# Patient Record
Sex: MALE | Race: WHITE | Employment: OTHER | URBAN - METROPOLITAN AREA
[De-identification: names, ages, dates, MRNs, and addresses within clinical notes are randomized per-mention and may not be internally consistent; named-entity substitution may affect disease eponyms.]

---

## 2022-05-18 ENCOUNTER — TRANSCRIBE ORDER (OUTPATIENT)
Dept: SCHEDULING | Age: 57
End: 2022-05-18

## 2022-05-18 DIAGNOSIS — R10.12 LUQ PAIN: ICD-10-CM

## 2022-05-18 DIAGNOSIS — R14.0 BLOATING: Primary | ICD-10-CM

## 2022-05-18 DIAGNOSIS — R10.13 EPIGASTRIC PAIN: ICD-10-CM

## 2022-05-18 DIAGNOSIS — Z12.11 SCREENING FOR COLON CANCER: ICD-10-CM

## 2022-08-31 ENCOUNTER — HOSPITAL ENCOUNTER (OUTPATIENT)
Age: 57
Setting detail: OUTPATIENT SURGERY
Discharge: HOME | End: 2022-08-31
Attending: INTERNAL MEDICINE | Admitting: INTERNAL MEDICINE
Payer: MEDICAID

## 2022-08-31 ENCOUNTER — ANESTHESIA EVENT (OUTPATIENT)
Dept: ENDOSCOPY | Age: 57
End: 2022-08-31
Payer: MEDICAID

## 2022-08-31 ENCOUNTER — ANESTHESIA (OUTPATIENT)
Dept: ENDOSCOPY | Age: 57
End: 2022-08-31
Payer: MEDICAID

## 2022-08-31 VITALS
WEIGHT: 185 LBS | SYSTOLIC BLOOD PRESSURE: 121 MMHG | HEIGHT: 68 IN | OXYGEN SATURATION: 95 % | RESPIRATION RATE: 12 BRPM | DIASTOLIC BLOOD PRESSURE: 85 MMHG | TEMPERATURE: 97.7 F | BODY MASS INDEX: 28.04 KG/M2 | HEART RATE: 64 BPM

## 2022-08-31 PROCEDURE — 2709999900 HC NON-CHARGEABLE SUPPLY: Performed by: INTERNAL MEDICINE

## 2022-08-31 PROCEDURE — 76040000007: Performed by: INTERNAL MEDICINE

## 2022-08-31 PROCEDURE — 77030021593 HC FCPS BIOP ENDOSC BSC -A: Performed by: INTERNAL MEDICINE

## 2022-08-31 PROCEDURE — 88305 TISSUE EXAM BY PATHOLOGIST: CPT

## 2022-08-31 PROCEDURE — 77030013992 HC SNR POLYP ENDOSC BSC -B: Performed by: INTERNAL MEDICINE

## 2022-08-31 PROCEDURE — 76060000032 HC ANESTHESIA 0.5 TO 1 HR: Performed by: INTERNAL MEDICINE

## 2022-08-31 PROCEDURE — 74011250636 HC RX REV CODE- 250/636: Performed by: NURSE ANESTHETIST, CERTIFIED REGISTERED

## 2022-08-31 PROCEDURE — 74011000250 HC RX REV CODE- 250: Performed by: NURSE ANESTHETIST, CERTIFIED REGISTERED

## 2022-08-31 RX ORDER — SODIUM CHLORIDE 0.9 % (FLUSH) 0.9 %
5-40 SYRINGE (ML) INJECTION AS NEEDED
Status: DISCONTINUED | OUTPATIENT
Start: 2022-08-31 | End: 2022-08-31 | Stop reason: HOSPADM

## 2022-08-31 RX ORDER — FLUMAZENIL 0.1 MG/ML
0.2 INJECTION INTRAVENOUS
Status: DISCONTINUED | OUTPATIENT
Start: 2022-08-31 | End: 2022-08-31 | Stop reason: HOSPADM

## 2022-08-31 RX ORDER — MIDAZOLAM HYDROCHLORIDE 1 MG/ML
.25-5 INJECTION, SOLUTION INTRAMUSCULAR; INTRAVENOUS
Status: DISCONTINUED | OUTPATIENT
Start: 2022-08-31 | End: 2022-08-31 | Stop reason: HOSPADM

## 2022-08-31 RX ORDER — LIDOCAINE HYDROCHLORIDE 20 MG/ML
INJECTION, SOLUTION EPIDURAL; INFILTRATION; INTRACAUDAL; PERINEURAL AS NEEDED
Status: DISCONTINUED | OUTPATIENT
Start: 2022-08-31 | End: 2022-08-31 | Stop reason: HOSPADM

## 2022-08-31 RX ORDER — EPINEPHRINE 0.1 MG/ML
1 INJECTION INTRACARDIAC; INTRAVENOUS
Status: DISCONTINUED | OUTPATIENT
Start: 2022-08-31 | End: 2022-08-31 | Stop reason: HOSPADM

## 2022-08-31 RX ORDER — SODIUM CHLORIDE 0.9 % (FLUSH) 0.9 %
5-40 SYRINGE (ML) INJECTION EVERY 8 HOURS
Status: DISCONTINUED | OUTPATIENT
Start: 2022-08-31 | End: 2022-08-31 | Stop reason: HOSPADM

## 2022-08-31 RX ORDER — NALOXONE HYDROCHLORIDE 0.4 MG/ML
0.4 INJECTION, SOLUTION INTRAMUSCULAR; INTRAVENOUS; SUBCUTANEOUS
Status: DISCONTINUED | OUTPATIENT
Start: 2022-08-31 | End: 2022-08-31 | Stop reason: HOSPADM

## 2022-08-31 RX ORDER — DEXTROMETHORPHAN/PSEUDOEPHED 2.5-7.5/.8
1.2 DROPS ORAL
Status: DISCONTINUED | OUTPATIENT
Start: 2022-08-31 | End: 2022-08-31 | Stop reason: HOSPADM

## 2022-08-31 RX ORDER — ATROPINE SULFATE 0.1 MG/ML
0.5 INJECTION INTRAVENOUS
Status: DISCONTINUED | OUTPATIENT
Start: 2022-08-31 | End: 2022-08-31 | Stop reason: HOSPADM

## 2022-08-31 RX ORDER — PROPOFOL 10 MG/ML
INJECTION, EMULSION INTRAVENOUS AS NEEDED
Status: DISCONTINUED | OUTPATIENT
Start: 2022-08-31 | End: 2022-08-31 | Stop reason: HOSPADM

## 2022-08-31 RX ORDER — SODIUM CHLORIDE 9 MG/ML
INJECTION, SOLUTION INTRAVENOUS
Status: DISCONTINUED | OUTPATIENT
Start: 2022-08-31 | End: 2022-08-31 | Stop reason: HOSPADM

## 2022-08-31 RX ORDER — SODIUM CHLORIDE 9 MG/ML
75 INJECTION, SOLUTION INTRAVENOUS CONTINUOUS
Status: DISCONTINUED | OUTPATIENT
Start: 2022-08-31 | End: 2022-08-31 | Stop reason: HOSPADM

## 2022-08-31 RX ORDER — FENTANYL CITRATE 50 UG/ML
12.5-2 INJECTION, SOLUTION INTRAMUSCULAR; INTRAVENOUS
Status: DISCONTINUED | OUTPATIENT
Start: 2022-08-31 | End: 2022-08-31 | Stop reason: HOSPADM

## 2022-08-31 RX ADMIN — LIDOCAINE HYDROCHLORIDE 40 MG: 20 INJECTION, SOLUTION EPIDURAL; INFILTRATION; INTRACAUDAL; PERINEURAL at 11:27

## 2022-08-31 RX ADMIN — PROPOFOL 100 MG: 10 INJECTION, EMULSION INTRAVENOUS at 11:29

## 2022-08-31 RX ADMIN — SODIUM CHLORIDE: 900 INJECTION, SOLUTION INTRAVENOUS at 11:26

## 2022-08-31 RX ADMIN — PROPOFOL 100 MG: 10 INJECTION, EMULSION INTRAVENOUS at 11:27

## 2022-08-31 RX ADMIN — PROPOFOL 50 MG: 10 INJECTION, EMULSION INTRAVENOUS at 11:39

## 2022-08-31 RX ADMIN — PROPOFOL 50 MG: 10 INJECTION, EMULSION INTRAVENOUS at 11:35

## 2022-08-31 RX ADMIN — PROPOFOL 50 MG: 10 INJECTION, EMULSION INTRAVENOUS at 11:31

## 2022-08-31 RX ADMIN — PROPOFOL 50 MG: 10 INJECTION, EMULSION INTRAVENOUS at 11:50

## 2022-08-31 NOTE — H&P
[address]  [address] [facility], [address]  [phone]        History and Physical     NAME: Angela Acuna   :  1965   MRN:  [id number]         HPI:  Angela Acuna is a 65 y.o. male here for EGD and colonoscopy. Patient reports epigastric and LUQ pain along with bloating. Patient has never had a colonoscopy. Denies any family h/o colon cancer. History reviewed. No pertinent surgical history. Past Medical History:   Diagnosis Date    COVID 10/24/2021    recovered at home     Elevated blood-pressure reading, without diagnosis of hypertension     Doc said not high enough for meds    Environmental and seasonal allergies     High triglycerides     Skin cancer 2021    MOH's procedure done, seeing derm     Vitamin D deficiency      Social History     Tobacco Use    Smoking status: Never    Smokeless tobacco: Never   Vaping Use    Vaping Use: Never used   Substance Use Topics    Alcohol use: Yes     Alcohol/week: 14.0 standard drinks     Types: 14 Cans of beer per week    Drug use: Never     No current facility-administered medications on file prior to encounter. Current Outpatient Medications on File Prior to Encounter   Medication Sig Dispense Refill    cholecalciferol (VITAMIN D3) (5000 Units /125 mcg) capsule Take 1 Capsule by mouth daily. 90 Capsule 3    omeprazole (PRILOSEC) 20 mg capsule Take 1 Capsule by mouth daily. 90 Capsule 1    ketoconazole (NIZORAL) 2 % shampoo USE TOPICALLY AS DIRECTED THREE TIMES A WEEK FOR DANDRUFF      cetirizine (ZYRTEC) 10 mg tablet Take  by mouth. No Known Allergies  Family History   Problem Relation Age of Onset    Other Mother 80        Mental health issues, valvular heart dis     Hypertension Father 80    Heart Attack Maternal Grandfather 54         at 66's     Stroke Paternal Grandmother 47     No current facility-administered medications for this encounter.        PHYSICAL EXAM:    BP (!) 140/90   Pulse 80 Temp 97.5 °F (36.4 °C)   Resp 12   Ht 5' 8\" (1.727 m)   Wt 83.9 kg (185 lb)   SpO2 97%   BMI 28.13 kg/m²      General: WD, WN. Alert, cooperative, no acute distress    HEENT: NC, Atraumatic. PERRLA, EOMI. Anicteric sclerae. Lungs:  CTA Bilaterally. No Wheezing/Rhonchi/Rales. Heart:  Regular  rhythm,  No murmur, No Rubs, No Gallops  Abdomen: Soft, Non distended, Non tender. +Bowel sounds, no HSM  Extremities: No c/c/e  Neurologic:  CN 2-12 gi, Alert and oriented X 3. No acute neurological distress   Psych:   Good insight. Not anxious nor agitated.        Assessment:   Epigastric and LUQ pain with bloating  Average risk screening, no prior colonoscopy    Plan:   Endoscopic procedure: EGD and colonoscopy  Anesthesia plan: Monitored Anesthesia Care

## 2022-08-31 NOTE — ROUTINE PROCESS
Nalini Banks  1965  [id number]    Situation:  Verbal report received from: Star Preston RN  Procedure: Procedure(s):  ESOPHAGOGASTRODUODENOSCOPY (EGD) AND COLONOSCOPY  COLONOSCOPY  ESOPHAGOGASTRODUODENAL (EGD) BIOPSY  ENDOSCOPIC POLYPECTOMY    Background:    Preoperative diagnosis: Epigastric pain [R10.13]  Colon cancer screening [Z12.11]  Postoperative diagnosis: colon polyp    :  Dr. Joan Goldsmith): Endoscopy Technician-1: Florencio Beth  Endoscopy RN-1: Jackie Frederick RN  Endoscopy RN-2: Nancy Sloan RN    Specimens:   ID Type Source Tests Collected by Time Destination   1 : Gastric biopsy Preservative Gastric  Nichol Emerson MD 8/31/2022 1131 Pathology   2 : Duodenum biopsy Preservative Duodenum  Nichol Emerson MD 8/31/2022 1133 Pathology   3 : Descending colon polyp Preservative Colon, Descending  Nichol Emerson MD 8/31/2022 1150 Pathology     H. Pylori  no    Assessment:  Intra-procedure medications   Anesthesia gave intra-procedure sedation and medications, see anesthesia flow sheet yes    Intravenous fluids: NS@ KVO     Vital signs stable     Abdominal assessment: round and soft     Recommendation:  Discharge patient per MD order.   Family or Friend Friend in [address] to share finding with family or friend yes

## 2022-08-31 NOTE — PROCEDURES
[address], [facility] [address]   [phone]               Colonoscopy Operative Report      Indications:  Average risk screening, no prior colonoscopy    :  Diana Ross MD    Staff: Endoscopy Technician-1: Margaret Mane  Endoscopy RN-1: Wallis Litten, RN  Endoscopy RN-2: Natalie Carr RN     Referring Provider: SARAY Arredondo    Sedation:  MAC anesthesia    Procedure Details:  After informed consent was obtained with all risks and benefits of procedure explained and preoperative exam completed, the patient was taken to the endoscopy suite and placed in the left lateral decubitus position. Upon sequential sedation as per above, a digital rectal exam was performed  And was normal.  The Olympus videocolonoscope  was inserted in the rectum and carefully advanced to the cecum, which was identified by the ileocecal valve and appendiceal orifice. The quality of preparation was good. The colonoscope was slowly withdrawn with careful evaluation between folds. Retroflexion in the rectum was performed and was normal..     Findings:   Rectum: normal  Sigmoid: normal  Descending Colon: 1  Sessile polyp(s), the largest 5 mm in size;  Transverse Colon: normal  Ascending Colon: normal  Cecum: normal  Terminal Ileum: not intubated    Interventions:  1 complete polypectomy were performed using cold snare  and the polyps were  retrieved    Specimen Removed:   ID Type Source Tests Collected by Time Destination   3 : Descending colon polyp Preservative Colon, Descending  Linda Quispe MD 8/31/2022 1150 Pathology       EBL:  Minimal    Complications:  None; patient tolerated the procedure well. Impression:  -Single small, 5 mm, polyp found in the descending colon, removed and sent for pathology  -Otherwise normal colon. Recommendations:   -Resume normal medication(s). -Regular diet.  -Await pathology.   -If adenoma is present, repeat colonoscopy in 7 years.  -Follow up with primary care physician.     -Call the office to schedule follow up if persistent symptoms. Discharge Disposition:  Home in the company of a  when able to ambulate.     Walter Valles MD  8/31/2022  11:53 AM

## 2022-08-31 NOTE — ANESTHESIA PREPROCEDURE EVALUATION
Relevant Problems   No relevant active problems       Anesthetic History   No history of anesthetic complications            Review of Systems / Medical History  Patient summary reviewed, nursing notes reviewed and pertinent labs reviewed    Pulmonary  Within defined limits                 Neuro/Psych   Within defined limits           Cardiovascular  Within defined limits                Exercise tolerance: >4 METS     GI/Hepatic/Renal           PUD     Endo/Other  Within defined limits           Other Findings              Physical Exam    Airway  Mallampati: II  TM Distance: > 6 cm  Neck ROM: normal range of motion   Mouth opening: Normal     Cardiovascular  Regular rate and rhythm,  S1 and S2 normal,  no murmur, click, rub, or gallop             Dental  No notable dental hx       Pulmonary  Breath sounds clear to auscultation               Abdominal  GI exam deferred       Other Findings            Anesthetic Plan    ASA: 2  Anesthesia type: MAC          Induction: Intravenous  Anesthetic plan and risks discussed with: Patient

## 2022-08-31 NOTE — PROCEDURES
[address], [address] [place] [address]  [phone]           Endoscopic Gastroduodenoscopy Procedure Note    Dillan Torres  1965  [id number]    Indication:  Epigastric and LUQ pain      : Anton Moreno MD    Staff: Endoscopy Technician-1: Marisela Hou  Endoscopy RN-1: Davina Logan RN  Endoscopy RN-2: Sunshine Lopez RN     Referring Provider:  SARAY Bowens      Anesthesia/Sedation:  MAC anesthesia      Procedure Details     After infomed consent was obtained for the procedure, with all risks and benefits of procedure explained the patient was taken to the endoscopy suite and placed in the left lateral decubitus position. Following sequential administration of sedation as per above, the endoscope was inserted into the mouth and advanced under direct vision to second portion of the duodenum. A careful inspection was made as the gastroscope was withdrawn, including a retroflexed view of the proximal stomach; findings and interventions are described below. Findings:   Esophagus:normal  Stomach: normal   Duodenum/jejunum: normal    Therapies:  biopsy of stomach fundus, body, antrum  biopsy of duodenal bulb and second part    Specimens:   ID Type Source Tests Collected by Time Destination   1 : Gastric biopsy Preservative Gastric  Cm Mcfarland MD 8/31/2022 1131 Pathology   2 : Duodenum biopsy Preservative Duodenum  Cm Mcfarland MD 8/31/2022 1133 Pathology               EBL: Minimal    Complications:  None; patient tolerated the procedure well.            Impression:      -Normal upper endoscopic exam without findings to explain symptoms.   -Gastric and duodenal biopsies obtained    Recommendations:  -Await pathology.  -Proceed with colonoscopy today    Anton Moreno MD  8/31/2022  11:36 AM

## 2022-08-31 NOTE — PERIOP NOTES

## 2022-08-31 NOTE — DISCHARGE INSTRUCTIONS
[address]  [address]  [phone]                         DISCHARGE INSTRUCTIONS    Ye Garcia  [id number]  1965    DISCOMFORT:  Redness at IV site- apply warm compress to area; if redness or soreness persist- contact your physician  There may be a slight amount of blood passed from the rectum  Gaseous discomfort- walking, belching will help relieve any discomfort  You may not operate a vehicle for 12 hours  You may not  engage in an occupation involving machinery or appliances for rest of today  You may not  drink alcoholic beverages for at least 12 hours  Avoid making any critical decisions for at least 24 hour    DIET:   Regular Diet    ACTIVITY  You may resume your normal daily activities   Spend the remainder of the day resting -  avoid any strenuous activity. CALL M.D. ANY SIGN OF   Increasing pain, nausea, vomiting  Abdominal distension (swelling)  New increased bleeding (oral or rectal)  Fever (chills)  Pain in chest area  Bloody discharge from nose or mouth  Shortness of breath      ENDOSCOPY FINDINGS:    Upper Endoscopy:    Impression:      -Normal upper endoscopic exam without findings to explain symptoms.   -Gastric and duodenal biopsies obtained     Recommendations:  -Await pathology. Colonoscopy:      Impression:  -Single small, 5 mm, polyp found in the descending colon, removed and sent for pathology  -Otherwise normal colon. Recommendations:   -Resume normal medication(s). -Regular diet.  -Await pathology. -If adenoma is present, repeat colonoscopy in 7 years.  -Follow up with primary care physician.     -Call the office to schedule follow up if persistent symptoms. Mike Blandon MD    Follow-up Instructions:   Call Dr. Jossy Schmidt for any questions or problems. If we took a biopsy please call the office within 2 weeks to discuss your pathology results.  Telephone # [phone]         Colon Polyps: Care Instructions  Your Care Instructions     Colon polyps are growths in the colon or the rectum. The cause of most colon polyps is not known, and most people who get them do not have any problems. But a certain kind can turn into cancer. For this reason, regular testing for colon polyps is important for people as they get older. It is also important for anyone who has an increased risk for colon cancer. Polyps are usually found through routine colon cancer screening tests. Although most colon polyps are not cancerous, they are usually removed and then tested for cancer. Screening for colon cancer saves lives because the cancer can usually be cured if it is caught early. If you have a polyp that is the type that can turn into cancer, you may need more tests to examine your entire colon. The doctor will remove any other polyps that he or she finds, and you will be tested more often. Follow-up care is a key part of your treatment and safety. Be sure to make and go to all appointments, and call your doctor if you are having problems. It's also a good idea to know your test results and keep a list of the medicines you take. How can you care for yourself at home? Regular exams to look for colon polyps are the best way to prevent polyps from turning into colon cancer. These can include stool tests, sigmoidoscopy, colonoscopy, and CT colonography. Talk with your doctor about a testing schedule that is right for you. To prevent polyps  There is no home treatment that can prevent colon polyps. But these steps may help lower your risk for cancer. Stay active. Being active can help you get to and stay at a healthy weight. Try to exercise on most days of the week. Walking is a good choice. Eat well. Choose a variety of vegetables, fruits, legumes (such as peas and beans), fish, poultry, and whole grains. Do not smoke. If you need help quitting, talk to your doctor about stop-smoking programs and medicines.  These can increase your chances of quitting for good. If you drink alcohol, limit how much you drink. Limit alcohol to 2 drinks a day for men and 1 drink a day for women. When should you call for help? Call your doctor now or seek immediate medical care if:    You have severe belly pain. Your stools are maroon or very bloody. Watch closely for changes in your health, and be sure to contact your doctor if:    You have a fever. You have nausea or vomiting. You have a change in bowel habits (new constipation or diarrhea). Your symptoms get worse or are not improving as expected. Where can you learn more? Go to http://www.paredes.com/  Enter C571 in the search box to learn more about \"Colon Polyps: Care Instructions. \"  Current as of: September 8, 2021               Content Version: 13.2  © 8250-8485 [employer]. Care instructions adapted under license by [employer] (which disclaims liability or warranty for this information). If you have questions about a medical condition or this instruction, always ask your healthcare professional. [place] [address] any warranty or liability for your use of this information. Learning About Coronavirus [phone])  Coronavirus [phone]): Overview  What is coronavirus (COVID-19)? The coronavirus disease (COVID-19) is caused by a virus. It is an illness that was first found in [place], [place], in December 2019. It has since spread worldwide. The virus can cause fever, cough, and trouble breathing. In severe cases, it can cause pneumonia and make it hard to breathe without help. It can cause death. Coronaviruses are a large group of viruses. They cause the common cold. They also cause more serious illnesses like Middle East respiratory syndrome (MERS) and severe acute respiratory syndrome (SARS). COVID-19 is caused by a novel coronavirus.  That means it's a new type that has not been seen in people before. This virus spreads person-to-person through droplets from coughing and sneezing. It can also spread when you are close to someone who is infected. And it can spread when you touch something that has the virus on it, such as a doorknob or a tabletop. What can you do to protect yourself from coronavirus (COVID-19)? The best way to protect yourself from getting sick is to: Avoid areas where there is an outbreak. Avoid contact with people who may be infected. Wash your hands often with soap or alcohol-based hand sanitizers. Avoid crowds and try to stay at least 6 feet away from other people. Wash your hands often, especially after you cough or sneeze. Use soap and water, and scrub for at least 20 seconds. If soap and water aren't available, use an alcohol-based hand . Avoid touching your mouth, nose, and eyes. What can you do to avoid spreading the virus to others? To help avoid spreading the virus to others:  Cover your mouth with a tissue when you cough or sneeze. Then throw the tissue in the trash. Use a disinfectant to clean things that you touch often. Stay home if you are sick or have been exposed to the virus. Don't go to school, work, or public areas. And don't use public transportation. If you are sick:  Leave your home only if you need to get medical care. But call the doctor's office first so they know you're coming. And wear a face mask, if you have one. If you have a face mask, wear it whenever you're around other people. It can help stop the spread of the virus when you cough or sneeze. Clean and disinfect your home every day. Use household  and disinfectant wipes or sprays. Take special care to clean things that you grab with your hands. These include doorknobs, remote controls, phones, and handles on your refrigerator and microwave. And don't forget countertops, tabletops, bathrooms, and computer keyboards.   When to call for help  Call 911 anytime you think you may need emergency care. For example, call if:  You have severe trouble breathing. (You can't talk at all.)  You have constant chest pain or pressure. You are severely dizzy or lightheaded. You are confused or can't think clearly. Your face and lips have a blue color. You pass out (lose consciousness) or are very hard to wake up. Call your doctor now if you develop symptoms such as:  Shortness of breath. Fever. Cough. If you need to get care, call ahead to the doctor's office for instructions before you go. Make sure you wear a face mask, if you have one, to prevent exposing other people to the virus. Where can you get the latest information? The following health organizations are tracking and studying this virus. Their websites contain the most up-to-date information. [employer] [place] also learn what to do if you think you may have been exposed to the virus. U.S. Centers for Disease Control and Prevention (CDC): The CDC provides updated news about the disease and travel advice. The website also tells you how to prevent the spread of infection. www.cdc.gov  World Health Organization [facility]): WHO offers information about the virus outbreaks. WHO also has travel advice. www.who.int  Current as of: April 1, 2020               Content Version: 12.4  © 2006-2020 [employer]. Care instructions adapted under license by your healthcare professional. If you have questions about a medical condition or this instruction, always ask your healthcare professional. [address] any warranty or liability for your use of this information.

## 2023-06-06 PROBLEM — C44.92 SQUAMOUS CELL CARCINOMA OF SKIN: Status: ACTIVE | Noted: 2021-01-20

## 2023-06-06 PROBLEM — E78.00 HIGH CHOLESTEROL: Status: ACTIVE | Noted: 2020-12-28

## 2024-03-16 NOTE — ANESTHESIA POSTPROCEDURE EVALUATION
Post-Anesthesia Evaluation and Assessment    Patient: Kerrie Abraham MRN: [id number]  SSN: [id number]    YOB: 1965  Age: 65 y.o. Sex: male      I have evaluated the patient and they are stable and ready for discharge from the PACU. Cardiovascular Function/Vital Signs  Visit Vitals  /85   Pulse 64   Temp 36.5 °C (97.7 °F)   Resp 12   Ht 5' 8\" (1.727 m)   Wt 83.9 kg (185 lb)   SpO2 95%   BMI 28.13 kg/m²       Patient is status post MAC anesthesia for Procedure(s):  ESOPHAGOGASTRODUODENOSCOPY (EGD) AND COLONOSCOPY  COLONOSCOPY  ESOPHAGOGASTRODUODENAL (EGD) BIOPSY  ENDOSCOPIC POLYPECTOMY. Nausea/Vomiting: None    Postoperative hydration reviewed and adequate. Pain:  Pain Scale 1: Numeric (0 - 10) (08/31/22 1231)  Pain Intensity 1: 0 (08/31/22 1231)   Managed    Neurological Status: At baseline    Mental Status, Level of Consciousness: Alert and  oriented to person, place, and time    Pulmonary Status:   O2 Device: None (Room air) (08/31/22 1231)   Adequate oxygenation and airway patent    Complications related to anesthesia: None    Post-anesthesia assessment completed. No concerns    Signed By: Abner Cain MD     August 31, 2022              Procedure(s):  ESOPHAGOGASTRODUODENOSCOPY (EGD) AND COLONOSCOPY  COLONOSCOPY  ESOPHAGOGASTRODUODENAL (EGD) BIOPSY  ENDOSCOPIC POLYPECTOMY. MAC    <BSHSIANPOST>    INITIAL Post-op Vital signs:   Vitals Value Taken Time   /59 08/31/22 1302   Temp 36.5 °C (97.7 °F) 08/31/22 1200   Pulse 57 08/31/22 1302   Resp 14 08/31/22 1302   SpO2 95 % 08/31/22 1227   Vitals shown include unvalidated device data. OBGYN

## 2025-07-29 ENCOUNTER — OFFICE VISIT (OUTPATIENT)
Age: 60
End: 2025-07-29

## 2025-07-29 VITALS
HEIGHT: 68 IN | DIASTOLIC BLOOD PRESSURE: 87 MMHG | SYSTOLIC BLOOD PRESSURE: 135 MMHG | WEIGHT: 190 LBS | BODY MASS INDEX: 28.79 KG/M2 | OXYGEN SATURATION: 97 % | RESPIRATION RATE: 20 BRPM | TEMPERATURE: 98.7 F | HEART RATE: 66 BPM

## 2025-07-29 DIAGNOSIS — R19.7 DIARRHEA, UNSPECIFIED TYPE: Primary | ICD-10-CM

## 2025-07-29 DIAGNOSIS — R03.0 ELEVATED BLOOD PRESSURE READING: ICD-10-CM

## 2025-07-29 LAB
EXP DATE SOLUTION: NORMAL
EXP DATE SWAB: NORMAL
EXPIRATION DATE: NORMAL
LOT NUMBER POC: NORMAL
LOT NUMBER SOLUTION: NORMAL
LOT NUMBER SWAB: NORMAL
SARS-COV-2 RNA, POC: NEGATIVE

## 2025-07-29 RX ORDER — ONDANSETRON 4 MG/1
4 TABLET, ORALLY DISINTEGRATING ORAL 3 TIMES DAILY PRN
Qty: 21 TABLET | Refills: 0 | Status: SHIPPED | OUTPATIENT
Start: 2025-07-29

## 2025-07-29 ASSESSMENT — ENCOUNTER SYMPTOMS
SINUS PRESSURE: 0
NAUSEA: 1
ABDOMINAL PAIN: 0
VOMITING: 0
RHINORRHEA: 0
SINUS PAIN: 0
DIARRHEA: 1

## 2025-07-29 NOTE — PROGRESS NOTES
2025   Bassam Kruse (: 1965) is a 60 y.o. male, New patient, here for evaluation of the following chief complaint(s):  Abdominal Pain (Abdominal cramping and chills, stomach ache starting Saturday in the middle of the night. - had mexican food that night, suspects food poisoning - feeling better, but still bouts of nausea and diarrhea) and Diarrhea (Diarrhea since Saturday - consistent )     ASSESSMENT/PLAN:  Below is the assessment and plan developed based on review of pertinent history, physical exam, labs, studies, and medications.       Assessment & Plan      Handout given with care instructions  OTC for symptom management. Increase fluid intake, ensure adequate nutritional intake.  Follow up with PCP as needed.  Go to ED with development of any acute symptoms.     Follow up:  No follow-ups on file.  Follow up immediately for any new, worsening or changes or if symptoms are not improving over the next 5-7 days.     SUBJECTIVE/OBJECTIVE:  Developed indigestion, abdominal cramping, nausea, diarrhea Saturday night (today is Tuesday) after a meal at a Mexican restaurant. He states he drank a bit more than he typically does as well.       History provided by:  Patient   used: No           Abdominal Pain (Abdominal cramping and chills, stomach ache starting Saturday in the middle of the night. - had mexican food that night, suspects food poisoning - feeling better, but still bouts of nausea and diarrhea) and Diarrhea (Diarrhea since Saturday - consistent )      No results found for any visits on 25.    No results found for this visit on 25.  XR Results (most recent):  @[place]([id number]:1)@         Review of Systems   Constitutional:  Positive for appetite change and chills. Negative for activity change, fatigue and fever.   HENT:  Negative for congestion, postnasal drip, rhinorrhea, sinus pressure and sinus pain.    Gastrointestinal:  Positive for diarrhea and

## 2025-07-29 NOTE — PATIENT INSTRUCTIONS
read the label to see how much sodium you are getting.  Buy fresh vegetables, or frozen vegetables without added sauces. Buy low-sodium versions of canned vegetables, soups, and other canned goods.  Prepare low-sodium meals  Cut back on the amount of salt you use in cooking. This will help you adjust to the taste. Do not add salt after cooking. One teaspoon of salt has about 2,300 mg of sodium.  Take the salt shaker off the table.  Flavor your food with garlic, lemon juice, onion, vinegar, herbs, and spices. Do not use soy sauce, lite soy sauce, steak sauce, onion salt, garlic salt, celery salt, or ketchup on your food.  Use low-sodium salad dressings, sauces, and ketchup. Or make your own salad dressings and sauces without adding salt.  Use less salt (or none) when recipes call for it. You can often use half the salt a recipe calls for without losing flavor. Other foods such as rice, pasta, and grains do not need added salt.  Rinse canned vegetables, and cook them in fresh water. This removes some--but not all--of the salt.  Avoid water that is naturally high in sodium or that has been treated with water softeners, which add sodium. If you buy bottled water, read the label and choose a sodium-free brand.  Avoid high-sodium foods  Avoid eating:  Smoked, cured, salted, and canned meat, fish, and poultry.  Ham, brown, hot dogs, and luncheon meats.  Regular, hard, and processed cheese and regular peanut butter.  Crackers with salted tops, and other salted snack foods such as pretzels, chips, and salted popcorn.  Frozen prepared meals, unless labeled low-sodium.  Canned and dried soups, broths, and bouillon, unless labeled sodium-free or low-sodium.  Canned vegetables, unless labeled sodium-free or low-sodium.  French fries, pizza, tacos, and other fast foods.  Pickles, olives, ketchup, and other condiments, especially soy sauce, unless labeled sodium-free or low-sodium.  Where can you learn more?  Go to

## (undated) DEVICE — SNARE ENDOSCP POLYP 2.4 MM 240 CM 10 MM 2.8 MM CAPTIVATOR

## (undated) DEVICE — TUBING HYDR IRR --

## (undated) DEVICE — TRAP SURG QUAD PARABOLA SLOT DSGN SFTY SCRN TRAPEASE

## (undated) DEVICE — FORCEPS BX L240CM JAW DIA2.8MM L CAP W/ NDL MIC MESH TOOTH